# Patient Record
Sex: FEMALE
[De-identification: names, ages, dates, MRNs, and addresses within clinical notes are randomized per-mention and may not be internally consistent; named-entity substitution may affect disease eponyms.]

---

## 2018-08-13 ENCOUNTER — HOSPITAL ENCOUNTER (OUTPATIENT)
Dept: HOSPITAL 31 - C.SDS | Age: 23
Discharge: HOME | End: 2018-08-13
Attending: OBSTETRICS & GYNECOLOGY
Payer: COMMERCIAL

## 2018-08-13 VITALS — BODY MASS INDEX: 31.7 KG/M2

## 2018-08-13 VITALS — SYSTOLIC BLOOD PRESSURE: 111 MMHG | HEART RATE: 74 BPM | TEMPERATURE: 97.7 F | DIASTOLIC BLOOD PRESSURE: 59 MMHG

## 2018-08-13 VITALS — OXYGEN SATURATION: 100 %

## 2018-08-13 VITALS — RESPIRATION RATE: 18 BRPM

## 2018-08-13 DIAGNOSIS — N84.0: Primary | ICD-10-CM

## 2018-08-13 DIAGNOSIS — R10.30: ICD-10-CM

## 2018-08-13 DIAGNOSIS — N92.1: ICD-10-CM

## 2018-08-13 PROCEDURE — 85730 THROMBOPLASTIN TIME PARTIAL: CPT

## 2018-08-13 PROCEDURE — 58558 HYSTEROSCOPY BIOPSY: CPT

## 2018-08-13 PROCEDURE — 81001 URINALYSIS AUTO W/SCOPE: CPT

## 2018-08-13 PROCEDURE — 85025 COMPLETE CBC W/AUTO DIFF WBC: CPT

## 2018-08-13 PROCEDURE — 86850 RBC ANTIBODY SCREEN: CPT

## 2018-08-13 PROCEDURE — 82948 REAGENT STRIP/BLOOD GLUCOSE: CPT

## 2018-08-13 PROCEDURE — 80048 BASIC METABOLIC PNL TOTAL CA: CPT

## 2018-08-13 PROCEDURE — 84703 CHORIONIC GONADOTROPIN ASSAY: CPT

## 2018-08-13 PROCEDURE — 85610 PROTHROMBIN TIME: CPT

## 2018-08-13 PROCEDURE — 84702 CHORIONIC GONADOTROPIN TEST: CPT

## 2018-08-13 PROCEDURE — 88305 TISSUE EXAM BY PATHOLOGIST: CPT

## 2018-08-13 PROCEDURE — 36415 COLL VENOUS BLD VENIPUNCTURE: CPT

## 2018-08-13 PROCEDURE — 86900 BLOOD TYPING SEROLOGIC ABO: CPT

## 2018-08-13 NOTE — PCM.SURG1
Surgeon's Initial Post Op Note





- Surgeon's Notes


Surgeon: Dr. Rdz


Assistant: None


Type of Anesthesia: General LMA


Anesthesia Administered By: Dr. Robles


Pre-Operative Diagnosis: 24 yo with Endometrial hyperplasia


Operative Findings: Av uterus 12-14


Post-Operative Diagnosis: Same as above


Operation Performed: Hysteroscopy Myosure , D and C


Specimen/Specimens Removed: Emc, ECC


Estimated Blood Loss: EBL {In ML}: 10


Blood Products Given: N/A


Drains Used: No Drains


Post-Op Condition: Good


Date of Surgery/Procedure: 08/13/18


Time of Surgery/Procedure: 11:46

## 2018-08-14 NOTE — OP
Copied To:  Johanna Rdz MD

Attending MD:  Johanna Rdz MD



PROCEDURE DATE:  08/13/2018



PREOPERATIVE DIAGNOSES:  A 23-year-old female with history of menorrhagia,

irregular menstrual period, endometrial hyperplasia.



POSTOPERATIVE DIAGNOSES:  A 23-year-old female with history of menorrhagia,

irregular menstrual period, endometrial hyperplasia.



PROCEDURES:  Hysteroscopy, MyoSure, dilatation and curettage.



SURGEON:  Johanna Rdz MD



TYPE OF ANESTHESIA:  General LMA.



ANESTHESIOLOGIST:  Brayden Sherman MD.



DESCRIPTION OF PROCEDURE:  The patient was informed of the risk factors,

benefits and alternatives of procedure.  Risk factors including infection,

bleeding, damage to surrounding organs and tissue, complication from

anesthesia and possible death.  After informed consent was obtained, she

was then taken to the operating room, prepped and draped in normal sterile

fashion, placed in dorsal lithotomy position.  A weighted speculum was

placed into the vagina.  The anterior lip of the cervix was grasped with

single-tooth tenaculum.  The uterus was gently sounded to approximately 8

cm upon complete uterine dilation.  The scope was then placed.  It was

noted that she had areas of proliferative endometrium.  In that particular

incident, the MyoSure device was then activated and _____ performed. 

Excellent hemostasis was noted.  The hysteroscope and the MyoSure device

were then removed and a fractional D and C was performed.  EMC and ECC were

submitted to pathology.  Excellent hemostasis.  The scope was reintroduced

making sure there were no areas of perforation.  Upon completion, all

instruments were removed from the vagina.  Instruments, sponge, needle

count were correct x2.  The patient was then taken to Recovery in stable

condition, instructed to follow up in the office in approximately 2 weeks.





__________________________________________

Johanna Rdz MD





DD:  08/13/2018 17:31:17

DT:  08/13/2018 21:32:27

Job # [id number]

## 2018-11-12 ENCOUNTER — HOSPITAL ENCOUNTER (OUTPATIENT)
Dept: HOSPITAL 31 - C.SDS | Age: 23
Discharge: HOME | End: 2018-11-12
Attending: OBSTETRICS & GYNECOLOGY
Payer: COMMERCIAL

## 2018-11-12 VITALS
HEART RATE: 81 BPM | TEMPERATURE: 97.9 F | DIASTOLIC BLOOD PRESSURE: 57 MMHG | RESPIRATION RATE: 17 BRPM | SYSTOLIC BLOOD PRESSURE: 114 MMHG

## 2018-11-12 VITALS — OXYGEN SATURATION: 100 %

## 2018-11-12 VITALS — BODY MASS INDEX: 29.8 KG/M2

## 2018-11-12 DIAGNOSIS — N92.0: ICD-10-CM

## 2018-11-12 DIAGNOSIS — R10.2: Primary | ICD-10-CM

## 2018-11-12 DIAGNOSIS — N92.6: ICD-10-CM

## 2018-11-12 PROCEDURE — 58558 HYSTEROSCOPY BIOPSY: CPT

## 2018-11-12 RX ADMIN — HYDROMORPHONE HYDROCHLORIDE PRN MG: 1 INJECTION, SOLUTION INTRAMUSCULAR; INTRAVENOUS; SUBCUTANEOUS at 12:49

## 2018-11-12 RX ADMIN — HYDROMORPHONE HYDROCHLORIDE PRN MG: 1 INJECTION, SOLUTION INTRAMUSCULAR; INTRAVENOUS; SUBCUTANEOUS at 12:36

## 2018-11-12 NOTE — PCM.SURG1
Surgeon's Initial Post Op Note





- Surgeon's Notes


Surgeon: Dr. Rdz


Assistant: Dr Junior Macdonald


Type of Anesthesia: General Endo


Anesthesia Administered By: Dr. Keys


Pre-Operative Diagnosis: 22 yo with Chronic pelvic pain , menorrhagia, irregular

mentrual cycle , Failure of medical therapy


Operative Findings: Av uterus with Pco


Post-Operative Diagnosis: SAME AS ABOVE


Operation Performed: Hysteroscopy D and C, Laparoscopy  chemotubation and 

ovarian drilling


Specimen/Specimens Removed: EMC, ECC


Estimated Blood Loss: EBL {In ML}: 5


Blood Products Given: N/A


Drains Used: No Drains


Post-Op Condition: Good


Date of Surgery/Procedure: 11/12/18


Time of Surgery/Procedure: 12:22

## 2018-11-12 NOTE — OP
PROCEDURE DATE:  11/12/2018



PREOPERATIVE DIAGNOSIS:  A 23-year-old female with history of menorrhagia,

irregular menstrual period, chronic pelvic pain and failure of medical

therapy.



POSTOPERATIVE DIAGNOSIS:  A 23-year-old female with history of menorrhagia,

irregular menstrual period, chronic pelvic pain and failure of medical

therapy.



PROCEDURES:  Hysteroscopy, dilation and curettage, diagnostic laparoscopy,

chromotubation, and ovarian drilling.



SURGEON:  Johanna Rdz MD



ASSISTANT:  Rachel Fabian MD



ANESTHESIOLOGIST:  Dr. Keys.



TYPE OF ANESTHESIA:  General anesthesia.



FINDINGS:  An anteverted uterus approximately 8-week gestation, noted to

have polycystic ovaries and some adhesions.



COMPLICATIONS:  None.



ESTIMATED BLOOD LOSS:  5 mL.



INPUTS AND OUTPUTS:  100 mL.



SPECIMEN:  EMC and ECC.



INTRAVENOUS FLUIDS:  900 mL.



DESCRIPTION OF PROCEDURE:  The patient was informed of the risk factors,

benefits and alternatives of the procedure.  Risks factors included

infection, bleeding, damage to the surrounding organs and tissues,

complication from anesthesia and possible death.  After informed consent

was obtained, the patient was then taken to the operating room, prepped and

draped in normal sterile fashion, placed in a dorsal lithotomy position. 

In that particular instance, a Vázquez catheter was placed in order to drain

the bladder.  A weighted speculum was placed into the vagina.  The anterior

lip of the cervix was grasped with a single-tooth tenaculum.  Then, uterus

was gently dilated to approximately 9 cm.  Reji dilators were used for

dilation.  Upon completion, the scope was then placed.  A complete

surveillance of the uterine cavity was then performed.  It was then removed

and a fractional D and C was performed and submitted to Pathology.  In that

particular instance, a HUMI was then advanced in order to manipulate the

uterus and all instruments from the vagina were removed.  Attention was

then turned to the umbilical fold which the 5-mm optic view was utilized

with the optic view entered directly, 4 L of CO2.  Upon entering, the gas

was being insufflated and _____ measures were made.  The abdomen was

insufflated using approximately 4 L of CO2 gas.  At this time, a second

incision was made using a 5-mm incision and a 5-mm trocar in the left lower

quadrant and the third trocar was inserted also using a 5-mm incision in

the right lower quadrant.  Examination of the pelvis revealed the finding

as above.  At this particular time, chromotubation was then performed that

demonstrated that bilateral tubes were patent.  Upon doing that, it was

found that she had polycystic ovarian syndrome and ovarian drilling was

also performed with excellent hemostasis.  Upon completion, the pelvis was

then irrigated copiously and once again hemostasis was assured.  All

instruments were removed from the abdomen.  Under proper visualization, the

skin was closed with 3-0 Monocryl.  Once the skin was closed with excellent

hemostasis, Steri-Strips was then applied.  Attention was then turned to

the pelvis where the Vázquez catheter was removed together with the HUMI. 

The patient tolerated the procedure well without any complications.  She

was then taken to the recovery room in stable condition.





__________________________________________

Johanna Rdz MD



DD:  11/12/2018 12:30:27

DT:  11/12/2018 16:51:22

Job # [id number]

## 2019-02-25 ENCOUNTER — HOSPITAL ENCOUNTER (OUTPATIENT)
Dept: HOSPITAL 31 - C.PAT | Age: 24
End: 2019-02-25
Payer: COMMERCIAL

## 2019-02-25 DIAGNOSIS — N92.1: ICD-10-CM

## 2019-02-25 DIAGNOSIS — Z01.818: Primary | ICD-10-CM

## 2019-02-27 ENCOUNTER — HOSPITAL ENCOUNTER (OUTPATIENT)
Dept: HOSPITAL 31 - C.SDS | Age: 24
Discharge: HOME | End: 2019-02-27
Attending: OBSTETRICS & GYNECOLOGY
Payer: COMMERCIAL

## 2019-02-27 VITALS — BODY MASS INDEX: 32.4 KG/M2

## 2019-02-27 VITALS — TEMPERATURE: 97.5 F | RESPIRATION RATE: 16 BRPM

## 2019-02-27 VITALS — DIASTOLIC BLOOD PRESSURE: 58 MMHG | SYSTOLIC BLOOD PRESSURE: 109 MMHG | HEART RATE: 73 BPM

## 2019-02-27 VITALS — OXYGEN SATURATION: 100 %

## 2019-02-27 DIAGNOSIS — N92.1: Primary | ICD-10-CM

## 2019-02-27 DIAGNOSIS — N84.0: ICD-10-CM

## 2019-02-27 PROCEDURE — 58558 HYSTEROSCOPY BIOPSY: CPT

## 2019-02-27 PROCEDURE — 88305 TISSUE EXAM BY PATHOLOGIST: CPT

## 2019-02-27 NOTE — PCM.SURG1
Surgeon's Initial Post Op Note





- Surgeon's Notes


Surgeon: Dr. Rdz


Assistant: none


Type of Anesthesia: General LMA


Anesthesia Administered By: Dr. Louis


Pre-Operative Diagnosis: 22 yo with Menorrhagia, Irregular menstrual cycle and 

failure of medical therapy


Operative Findings: Av uterus 14 wks


Post-Operative Diagnosis: Same as above


Operation Performed: Hysteroscopy Myosure D and C


Specimen/Specimens Removed: EMC, ECC


Estimated Blood Loss: EBL {In ML}: 10


Blood Products Given: N/A


Drains Used: No Drains


Post-Op Condition: Good


Date of Surgery/Procedure: 02/27/19


Time of Surgery/Procedure: 12:18

## 2019-02-28 NOTE — OP
PROCEDURE DATE:  02/27/2019



PREOPERATIVE DIAGNOSES:  A 23-year-old female with menorrhagia, irregular

menstrual period, and failure of medical therapy.



POSTOPERATIVE DIAGNOSES:  A 23-year-old female with menorrhagia, irregular

menstrual period, and failure of medical therapy.



SURGEON:  Johanna Rdz MD



ANESTHESIOLOGIST:  Riaz Chairez MD



TYPE OF ANESTHESIA:  LMA.



FINDING:  Anteverted uterus with areas of endometrial polyp.



COMPLICATIONS:  None.



ESTIMATED BLOOD LOSS:  5 mL.



IN'S AND OUT:  170 mL.



IV FLUIDS:  300 mL.



SPECIMEN:  EMC, ECC, and polyp.



DESCRIPTION OF PROCEDURE:  The patient was informed of the risk factors,

benefits, and alternatives of the procedure.  Risks factors included

infection, bleeding, damage to surrounding organs and tissue.  All

questions were answered.  Informed consent was obtained.  Once the informed

consent was obtained, she was then taken to the operating room, prepped and

draped in normal sterile fashion, placed in dorsal lithotomy position.  A

weighted speculum was inserted into the vagina.  The anterior lip of the

cervix was grasped with single-tooth tenaculum.  The uterus was gently

sounded to approximately 10 cm.  Upon complete uterine dilation, the scope

was then placed.  A complete surveillance of the uterine cavity was then

performed.  The MyoSure LITE device was then activated and turned on under

direct visualization.  Endometrial polyp was removed.  Excellent hemostasis

was noted.  Upon completion, the scope was then removed and a fractional D

and C was performed.  EMC and ECC were submitted to Pathology.  The scope

was reintroduced making sure there were no areas of perforation.  Upon

completion, all instruments were then removed from the vagina.  Instrument

and lap counts were correct x2.  The patient was then taken to the recovery

room in stable condition and instructed to follow up in the office in a

week.





__________________________________________

Johanna Rdz MD





DD:  02/27/2019 12:31:50

DT:  02/27/2019 17:47:37

Job # [id number]

## 2019-03-02 ENCOUNTER — HOSPITAL ENCOUNTER (EMERGENCY)
Dept: HOSPITAL 14 - H.ER | Age: 24
Discharge: HOME | End: 2019-03-02
Payer: COMMERCIAL

## 2019-03-02 VITALS
HEART RATE: 99 BPM | TEMPERATURE: 98.9 F | RESPIRATION RATE: 18 BRPM | SYSTOLIC BLOOD PRESSURE: 134 MMHG | DIASTOLIC BLOOD PRESSURE: 82 MMHG | OXYGEN SATURATION: 100 %

## 2019-03-02 VITALS — BODY MASS INDEX: 32.4 KG/M2

## 2019-03-02 DIAGNOSIS — E28.2: ICD-10-CM

## 2019-03-02 DIAGNOSIS — D64.9: ICD-10-CM

## 2019-03-02 DIAGNOSIS — N83.202: ICD-10-CM

## 2019-03-02 DIAGNOSIS — N92.0: Primary | ICD-10-CM

## 2019-03-02 LAB
BASOPHILS # BLD AUTO: 0 K/UL (ref 0–0.2)
BASOPHILS NFR BLD: 0.7 % (ref 0–2)
BUN SERPL-MCNC: 9 MG/DL (ref 7–17)
CALCIUM SERPL-MCNC: 9.8 MG/DL (ref 8.4–10.2)
EOSINOPHIL # BLD AUTO: 0.1 K/UL (ref 0–0.7)
EOSINOPHIL NFR BLD: 2.2 % (ref 0–4)
ERYTHROCYTE [DISTWIDTH] IN BLOOD BY AUTOMATED COUNT: 14.5 % (ref 11.5–14.5)
GFR NON-AFRICAN AMERICAN: > 60
HGB BLD-MCNC: 11.8 G/DL (ref 12–16)
LYMPHOCYTES # BLD AUTO: 1.6 K/UL (ref 1–4.3)
LYMPHOCYTES NFR BLD AUTO: 31 % (ref 20–40)
MCH RBC QN AUTO: 27.5 PG (ref 27–31)
MCHC RBC AUTO-ENTMCNC: 32.7 G/DL (ref 33–37)
MCV RBC AUTO: 84 FL (ref 81–99)
MONOCYTES # BLD: 0.5 K/UL (ref 0–0.8)
MONOCYTES NFR BLD: 10.3 % (ref 0–10)
NEUTROPHILS # BLD: 2.8 K/UL (ref 1.8–7)
NEUTROPHILS NFR BLD AUTO: 55.8 % (ref 50–75)
NRBC BLD AUTO-RTO: 0 % (ref 0–0)
PLATELET # BLD: 257 K/UL (ref 130–400)
PMV BLD AUTO: 10.3 FL (ref 7.2–11.7)
RBC # BLD AUTO: 4.31 MIL/UL (ref 3.8–5.2)
WBC # BLD AUTO: 5.1 K/UL (ref 4.8–10.8)

## 2019-03-02 PROCEDURE — 85025 COMPLETE CBC W/AUTO DIFF WBC: CPT

## 2019-03-02 PROCEDURE — 99283 EMERGENCY DEPT VISIT LOW MDM: CPT

## 2019-03-02 PROCEDURE — 80048 BASIC METABOLIC PNL TOTAL CA: CPT

## 2019-03-02 PROCEDURE — 96374 THER/PROPH/DIAG INJ IV PUSH: CPT

## 2019-03-02 PROCEDURE — 96375 TX/PRO/DX INJ NEW DRUG ADDON: CPT

## 2019-03-02 PROCEDURE — 81025 URINE PREGNANCY TEST: CPT

## 2019-03-02 NOTE — ED PDOC
HPI: Female  Pain


Time Seen by Provider: 03/02/19 14:37


Chief Complaint (Nursing): Female Genitourinary


Chief Complaint (Provider): Female Genitourinary


History Per: Patient


History/Exam Limitations: no limitations


Onset/Duration Of Symptoms: Days


Current Symptoms Are (Timing): Still Present


Additional Complaint(s): 


22 y/o female with a PMHx of irregular menstruation, PCOS, migraines and chronic

lower abdominal pain presents to the ED for evaluation of vaginal bleeding for 

the past three days associated with a headache for the past couple of weeks. 

Patient reports of having painful and heavy menstrual periods for which she is 

being evaluated and treated by her Gynecologist at [facility], Dr. Brooke.

Patient notes she was given birth control medications for treatment and had a 

D&C last Wednesday. Patient states bleeding is no longer heavy. Patient notes 

bleeding is mild and persistent. Patient is now concerned if she is anemic. 

Patient reports current headache is similar to those of her migraines. Patient 

notes of taking Tylenol and Ibuprofen with no relief of symptoms. Of note, 

patient reports of have an US recently that confirmed Ovarian Cysts. Patient 

states she has a follow up appointment with her OB/GYN next Wednesday. Patient 

notes she is currently not taking birth control now or bleeding at this time. 

Denies fever, nausea, vomiting, diarrhea and urinary symptoms. 





PMD: Jacqueline Ambriz


Abnormal Vaginal Bleeding: Yes





Past Medical History


Reviewed: Historical Data, Nursing Documentation, Vital Signs


Vital Signs: 





                                Last Vital Signs











Temp  98.9 F   03/02/19 14:22


 


Pulse  99 H  03/02/19 14:22


 


Resp  18   03/02/19 14:22


 


BP  134/82   03/02/19 14:22


 


Pulse Ox  100   03/02/19 14:22














- Medical History


PMH: Anemia, Kidney Stones


   Denies: HIV





- Surgical History


Surgical History: No Surg Hx





- Family History


Family History: States: Unknown Family Hx





- Immunization History


Hx Tetanus Toxoid Vaccination: No


Hx Influenza Vaccination: Yes


Hx Pneumococcal Vaccination: No





- Home Medications


Home Medications: 


                                Ambulatory Orders











 Medication  Instructions  Recorded


 


Acetaminophen/Butalbital/Caf 1 tab PO TID PRN #15 tab 03/02/19





[Fioricet]  














- Allergies


Allergies/Adverse Reactions: 


                                    Allergies











Allergy/AdvReac Type Severity Reaction Status Date / Time


 


No Known Allergies Allergy   Verified 02/25/19 10:17














Review of Systems


ROS Statement: Except As Marked, All Systems Reviewed And Found Negative


Constitutional: Negative for: Fever


Gastrointestinal: Positive for: Abdominal Pain.  Negative for: Nausea, Vomiting,

 Diarrhea


Genitourinary Female: Positive for: Vaginal Bleeding.  Negative for: Dysuria, 

Frequency, Hematuria


Neurological: Positive for: Headache





Physical Exam





- Reviewed


Nursing Documentation Reviewed: Yes


Vital Signs Reviewed: Yes





- Physical Exam


Appears: Positive for: No Acute Distress (comfortable).  Negative for: 

Uncomfortable


Head Exam: Positive for: ATRAUMATIC, NORMOCEPHALIC


Skin: Positive for: Normal Color, Warm, Dry


Eye Exam: Positive for: Normal appearance, EOMI, PERRL


ENT: Positive for: Normal ENT Inspection


Neck: Positive for: Normal, Painless ROM, Supple


Cardiovascular/Chest: Positive for: Regular Rate, Rhythm.  Negative for: Murmur


Respiratory: Positive for: Normal Breath Sounds.  Negative for: Respiratory 

Distress


Gastrointestinal/Abdominal: Positive for: Normal Exam, Soft.  Negative for: 

Tenderness


Back: Positive for: Normal Inspection.  Negative for: L CVA Tenderness, R CVA 

Tenderness, Vertebral Tenderness


Extremity: Positive for: Normal ROM.  Negative for: Deformity


Neurologic/Psych: Positive for: Alert, Oriented.  Negative for: Motor/Sensory 

Deficits





- Laboratory Results


Result Diagrams: 


                                 03/02/19 15:28





                                 03/02/19 15:28





- ECG


O2 Sat by Pulse Oximetry: 100 (RA)


Pulse Ox Interpretation: Normal





- Progress


Re-evaluation Time: 17:30


Condition: Re-examined, Improved





Medical Decision Making


Medical Decision Making: 


Time: 1448


Impression: Headache and Vaginal Bleeding


Differentials: Most likely migraine headache, anemia, iron deficient anemia. 

Symptoms appears to be a recurrent dysfunctional uterine bleeding that is 

chronic. 


Plan:


-- BMP


-- ED Urine Pregnancy


-- ED Urine Dipstick


-- CBC with Differentials


-- Reglan 10 mg IVP


-- Toradol 30 mg IVP


-- IV Insertion








_________________________________________________

________________________________


Scribe Attestation:


Documented by Jammie Stringer, acting as a scribe for Shantel Mayorga MD.





Provider Scribe Attestation:


All medical record entries made by the Scribe were at my direction and 

personally dictated by me. I have reviewed the chart and agree that the record 

accurately reflects my personal performance of the history, physical exam, 

medical decision making, and the department course for this patient. I have also

 personally directed, reviewed, and agree with the discharge instructions and 

disposition.





Disposition





- Clinical Impression


Clinical Impression: 


 Vaginal bleeding, Headache








- Patient ED Disposition


Is Patient to be Admitted: No


Doctor Will See Patient In The: Office


Counseled Patient/Family Regarding: Studies Performed, Diagnosis, Need For Fol

lowup





- Disposition


Referrals: 


[facility], located within [facility] [Outside]


Kiana Brooke MD [Medical Doctor] - 


Disposition: Routine/Home


Disposition Time: 17:30


Condition: GOOD


Additional Instructions: 





JERROD PEPE, thank you for letting us take care of you today. Your 

provider was Shantel Mayorga MD and you were treated for HEAD PAIN. The [facility] medical care you received today was directed at your acute symptoms. If

you were prescribed any medication, please fill it and take as directed. It may 

take several days for your symptoms to resolve. Return to the Emergency 

Department if your symptoms worsen, do not improve, or if you have any other 

problems.





Please contact your doctor or call one of the physicians/clinics you have been 

referred to that are listed on the Patient Visit Information form that is 

included in your discharge packet. Bring any paperwork you were given at 

discharge with you along with any medications you are taking to your follow up 

visit. Our treatment cannot replace ongoing medical care by a primary care 

provider outside of the emergency department.





Thank you for allowing the [facility] team to be part of your care today.








If you had an X-Ray or CT scan: A Radiologist will review the ED reading if any 

change in treatment is needed we will contact you.***





If you had a blood, urine, or wound culture: It will take several days for the 

results, if any change in treatment is needed we will contact you.***





If you had an STI test: It will take 48 hours for the results. Please call after

1 week if you have not heard back.***


Prescriptions: 


Acetaminophen/Butalbital/Caf [Fioricet] 1 tab PO TID PRN #15 tab


 PRN Reason: Headache


Instructions:  Migraine Headache (DC), Heavy Periods (DC)


Print Language: Czech